# Patient Record
Sex: MALE | URBAN - METROPOLITAN AREA
[De-identification: names, ages, dates, MRNs, and addresses within clinical notes are randomized per-mention and may not be internally consistent; named-entity substitution may affect disease eponyms.]

---

## 2020-03-09 ENCOUNTER — NURSE TRIAGE (OUTPATIENT)
Dept: NURSING | Facility: CLINIC | Age: 25
End: 2020-03-09

## 2020-03-10 NOTE — TELEPHONE ENCOUNTER
"\"I was using an ear wax removal kit and I think a plastic tip broke off and is lodged in my ear\".     Reviewed with caller sxs to be seen in ER tonight.    Reason for Disposition    [1] Foreign body AND [2] can't remove at home    Additional Information    Negative: MODERATE-SEVERE ear pain    Negative: Button battery    Negative: Sharp foreign body (e.g., glass, pin)    Negative: Bleeding from ear canal    Negative: Caller is unable to remove live insect    Protocols used: EAR - FOREIGN BODY-A-    Sneha Aquino RN  [facility]      "

## 2021-04-23 ENCOUNTER — APPOINTMENT (OUTPATIENT)
Dept: URBAN - METROPOLITAN AREA CLINIC 256 | Age: 26
Setting detail: DERMATOLOGY
End: 2021-04-23

## 2021-04-23 DIAGNOSIS — L65.9 NONSCARRING HAIR LOSS, UNSPECIFIED: ICD-10-CM

## 2021-04-23 PROCEDURE — OTHER ADDITIONAL NOTES: OTHER

## 2021-04-23 PROCEDURE — 99202 OFFICE O/P NEW SF 15 MIN: CPT

## 2021-04-23 PROCEDURE — OTHER COUNSELING: OTHER

## 2021-04-23 ASSESSMENT — LOCATION SIMPLE DESCRIPTION DERM
LOCATION SIMPLE: SCALP
LOCATION SIMPLE: LEFT SCALP

## 2021-04-23 ASSESSMENT — LOCATION ZONE DERM: LOCATION ZONE: SCALP

## 2021-04-23 ASSESSMENT — LOCATION DETAILED DESCRIPTION DERM
LOCATION DETAILED: LEFT MEDIAL FRONTAL SCALP
LOCATION DETAILED: RIGHT CENTRAL FRONTAL SCALP
LOCATION DETAILED: LEFT CENTRAL FRONTAL SCALP

## 2021-04-23 NOTE — PROCEDURE: ADDITIONAL NOTES
Additional Notes: -[facility] discussed with patient that he is not losing hair from what can be seen.\\n-Patient is having discoloration from using Minoxidil.\\n-Discussed that Finasteride is the best treatment option for hair loss.\\n-Referred to Dr. Gabriel if patient wants a second opinion. Additional Notes: -[facility] discussed with patient that he is not losing hair from what can be seen.\\n-Patient is having discoloration from using Minoxidil.\\n-Discussed that Finasteride is the best treatment option for hair loss.\\n-Referred to Dr. Gabriel if patient wants a second opinion.